# Patient Record
Sex: FEMALE | Race: WHITE | NOT HISPANIC OR LATINO | Employment: FULL TIME | URBAN - METROPOLITAN AREA
[De-identification: names, ages, dates, MRNs, and addresses within clinical notes are randomized per-mention and may not be internally consistent; named-entity substitution may affect disease eponyms.]

---

## 2023-11-24 ENCOUNTER — HOSPITAL ENCOUNTER (EMERGENCY)
Facility: HOSPITAL | Age: 48
Discharge: HOME | End: 2023-11-24
Attending: EMERGENCY MEDICINE
Payer: MEDICARE

## 2023-11-24 VITALS
TEMPERATURE: 97.2 F | RESPIRATION RATE: 18 BRPM | SYSTOLIC BLOOD PRESSURE: 101 MMHG | OXYGEN SATURATION: 100 % | HEART RATE: 81 BPM | DIASTOLIC BLOOD PRESSURE: 83 MMHG

## 2023-11-24 DIAGNOSIS — M25.571 PAIN IN RIGHT ANKLE: Primary | ICD-10-CM

## 2023-11-24 PROCEDURE — 99282 EMERGENCY DEPT VISIT SF MDM: CPT

## 2023-11-24 PROCEDURE — 99285 EMERGENCY DEPT VISIT HI MDM: CPT | Performed by: EMERGENCY MEDICINE

## 2023-11-24 PROCEDURE — 96372 THER/PROPH/DIAG INJ SC/IM: CPT

## 2023-11-24 RX ORDER — ACETAMINOPHEN 325 MG/1
975 TABLET ORAL ONCE
Status: COMPLETED | OUTPATIENT
Start: 2023-11-24 | End: 2023-11-24

## 2023-11-24 RX ORDER — NAPROXEN 500 MG/1
500 TABLET ORAL 2 TIMES DAILY WITH MEALS
Qty: 30 TABLET | Refills: 0 | Status: SHIPPED | OUTPATIENT
Start: 2023-11-24

## 2023-11-24 RX ORDER — KETOROLAC TROMETHAMINE 30 MG/ML
15 INJECTION, SOLUTION INTRAMUSCULAR; INTRAVENOUS ONCE
Status: COMPLETED | OUTPATIENT
Start: 2023-11-24 | End: 2023-11-24

## 2023-11-24 RX ADMIN — KETOROLAC TROMETHAMINE 15 MG: 30 INJECTION, SOLUTION INTRAMUSCULAR at 19:11

## 2023-11-24 RX ADMIN — ACETAMINOPHEN 975 MG: 325 TABLET, FILM COATED ORAL at 19:11

## 2023-11-24 NOTE — Clinical Note
Deb Flores was seen and treated in our emergency department on 11/24/2023. Diagnosis: R ankle pain    Manolo Em  may return to work on return date. She may return on this date: 11/28/2023         If you have any questions or concerns, please don't hesitate to call.       Amado Waggoner, DO    ______________________________           _______________          _______________  Hospital Representative                              Date                                Time

## 2023-11-24 NOTE — Clinical Note
Lisa Garcia was seen and treated in our emergency department on 11/24/2023. Diagnosis: R ankle pain    Haley King  may return to work on return date. She may return on this date: 11/28/2023         If you have any questions or concerns, please don't hesitate to call.       Kaur Carson, DO    ______________________________           _______________          _______________  Hospital Representative                              Date                                Time

## 2023-11-25 NOTE — ED PROVIDER NOTES
History  Chief Complaint   Patient presents with    Leg Pain     Right leg pain and swelling since last Friday. Patient denies SOB. HPI    Patient is a 44-year-old female with past medical history of depression, anxiety, type 2 diabetes, hyperlipidemia, hypothyroidism, presenting to the ED for evaluation of atraumatic right ankle pain past several days. Patient states that she noticed the pain approximately 1 week ago, is an initial aching in the right lateral ankle. She went to a water park the day after, and thought that she might have had shinsplints from that. The pain in the right ankle has been persistent, and is now traveling up the lateral side of the calf to about the knee. Patient is able to ambulate but with pain. She has tried taking Motrin for her symptoms, experiences intermittent relief. Patient denies injury to the ankle. She also feels that her ankle is more swollen when compared to the left 1. There is no redness, itching, sensitivity to the skin of the ankle. Patient denies any open wounds on the feet. She denies fevers, chills, long car rides or plane flights, smoking history. She does note prior DVT, related to pregnancy. Not currently on anticoagulation. None       Past Medical History:   Diagnosis Date    Diabetes mellitus ([address])     type 2    Hashimoto's disease        History reviewed. No pertinent surgical history. History reviewed. No pertinent family history. I have reviewed and agree with the history as documented. E-Cigarette/Vaping     E-Cigarette/Vaping Substances     Social History     Tobacco Use    Smoking status: Never    Smokeless tobacco: Never   Substance Use Topics    Alcohol use: Never    Drug use: Not Currently        Review of Systems   All other systems reviewed and are negative.       Physical Exam  ED Triage Vitals [11/24/23 1807]   Temperature Pulse Respirations Blood Pressure SpO2   (!) 97.2 °F (36.2 °C) 81 18 101/83 100 %      Temp Source Heart Neurosurgery/Event Note Rate Source Patient Position - Orthostatic VS BP Location FiO2 (%)   Temporal Monitor Sitting Left arm --      Pain Score       6             Orthostatic Vital Signs  Vitals:    11/24/23 1807   BP: 101/83   Pulse: 81   Patient Position - Orthostatic VS: Sitting       Physical Exam  Vitals and nursing note reviewed. Constitutional:       General: She is not in acute distress. Appearance: Normal appearance. She is well-developed and normal weight. She is not ill-appearing or toxic-appearing. HENT:      Head: Normocephalic and atraumatic. Right Ear: External ear normal.      Left Ear: External ear normal.      Nose: Nose normal. No congestion. Mouth/Throat:      Mouth: Mucous membranes are moist.      Pharynx: Oropharynx is clear. Eyes:      Extraocular Movements: Extraocular movements intact. Conjunctiva/sclera: Conjunctivae normal.   Cardiovascular:      Rate and Rhythm: Normal rate and regular rhythm. Pulses: Normal pulses. Heart sounds: Normal heart sounds. No murmur heard. Pulmonary:      Effort: Pulmonary effort is normal. No respiratory distress. Breath sounds: Normal breath sounds. No wheezing, rhonchi or rales. Abdominal:      General: Abdomen is flat. Palpations: Abdomen is soft. Tenderness: There is no abdominal tenderness. There is no guarding or rebound. Musculoskeletal:         General: No swelling. Cervical back: Normal range of motion and neck supple. No tenderness. Right knee: Normal.      Left knee: Normal.      Right lower leg: Normal. No swelling or tenderness. No edema. Left lower leg: Normal. No swelling or tenderness. No edema. Right ankle: No swelling. Tenderness present over the lateral malleolus. Normal pulse. Left ankle: No swelling. Right foot: Normal. Normal capillary refill. No swelling or deformity. Normal pulse. Left foot: Normal. Normal capillary refill. No swelling or deformity. Normal pulse. Comments: No calf tenderness, negative Homans' sign. The right lower extremity does not appear to have any evidence of cellulitis. Skin:     General: Skin is warm and dry. Capillary Refill: Capillary refill takes less than 2 seconds. Neurological:      Mental Status: She is alert. Psychiatric:         Mood and Affect: Mood normal.         ED Medications  Medications   ketorolac (TORADOL) injection 15 mg (15 mg Intramuscular Given 11/24/23 1911)   acetaminophen (TYLENOL) tablet 975 mg (975 mg Oral Given 11/24/23 1911)       Diagnostic Studies  Results Reviewed       None                   No orders to display         Procedures  Procedures      ED Course       Patient is a 44-year-old female presenting to the ED for evaluation of atraumatic right ankle pain. Patient states that the pain occasionally radiates to the proximal right knee. No history of injury. History and clinical exam documented above. The extremity does not appear cellulitic. There is negative Homans' sign. Patient reports taking Motrin, with slight improvement of the pain. This points to perhaps more likely musculoskeletal cause, then pathologic. There is no history of trauma, therefore x-ray was deferred. There is no tenderness of the deep vasculature of the leg, therefore DVT is not suspected. Will treat with Toradol and Tylenol for musculoskeletal pain. An Ace wrap was provided. Supportive care advised, reassurance provided. I gave oral return precautions for what to return for in addition to the written return precautions. The patient verbalized understanding of the discharge instructions and warnings that would necessitate return to the Emergency Department. I specifically highlighted areas of special concern regarding the written and verbal discharge instructions and return precautions. All questions were answered prior to discharge.       CRAFFT      Flowsheet Row Most Recent Value   MATILDA Initial Screen: During the past 12 months, did you:    1. Drink any alcohol (more than a few sips)? No Filed at: 11/24/2023 1839   2. Smoke any marijuana or hashish No Filed at: 11/24/2023 1839   3. Use anything else to get high? ("anything else" includes illegal drugs, over the counter and prescription drugs, and things that you sniff or 'kothari')? No Filed at: 11/24/2023 1839                            SBIRT 20yo+      Flowsheet Row Most Recent Value   Initial Alcohol Screen: US AUDIT-C     1. How often do you have a drink containing alcohol? 0 Filed at: 11/24/2023 1839   2. How many drinks containing alcohol do you have on a typical day you are drinking? 0 Filed at: 11/24/2023 1839   3a. Male UNDER 65: How often do you have five or more drinks on one occasion? 0 Filed at: 11/24/2023 1839   3b. FEMALE Any Age, or MALE 65+: How often do you have 4 or more drinks on one occassion? 0 Filed at: 11/24/2023 1839   Audit-C Score 0 Filed at: 11/24/2023 1839   ELLE: How many times in the past year have you. .. Used an illegal drug or used a prescription medication for non-medical reasons? Never Filed at: 11/24/2023 1839                  Medical Decision Making  Risk  OTC drugs. Prescription drug management. Disposition  Final diagnoses:   Pain in right ankle     Time reflects when diagnosis was documented in both MDM as applicable and the Disposition within this note       Time User Action Codes Description Comment    11/24/2023  7:09 PM Didi Storey Add [M25.571] Pain in right ankle           ED Disposition       ED Disposition   Discharge    Condition   Stable    Date/Time   Fri Nov 24, 2023 [address] discharge to home/self care.                    Follow-up Information       Follow up With Specialties Details Why [address] Andrew, DO Family Medicine Schedule an appointment as soon as possible for a visit   [address] [place] [address] [address]  [address]              Discharge Medication List as of 11/24/2023  7:49 PM        START taking these medications    Details   naproxen (Naprosyn) 500 mg tablet Take 1 tablet (500 mg total) by mouth 2 (two) times a day with meals, Starting Fri 11/24/2023, Normal           No discharge procedures on file. PDMP Review       None             ED Provider  Attending physically available and evaluated [facility]. I managed the patient along with the ED Attending.     Electronically Signed by           Donnell Go DO  11/24/23 [id number]

## 2023-11-25 NOTE — ED ATTENDING ATTESTATION
11/24/2023  I, Linnie Landau, MD, saw and evaluated the patient. I have discussed the patient with the resident/non-physician practitioner and agree with the resident's/non-physician practitioner's findings, Plan of Care, and MDM as documented in the resident's/non-physician practitioner's note, except where noted. All available labs and Radiology studies were reviewed. I was present for key portions of any procedure(s) performed by the resident/non-physician practitioner and I was immediately available to provide assistance. At this point I agree with the current assessment done in the Emergency Department. I have conducted an independent evaluation of this patient a history and physical is as follows:    R ankle pain. Was at [facility] on last Thursday and no trauma. Has lateral ankle and leg pain. No pitting edema. No trauma. Remote hx DVT in pregnancy. VS and nursing notes reviewed  General: Appears in NAD, awake, alert, speaking normally in full sentences. Well-nourished, well-developed. Appears stated age. Head: Normocephalic, atraumatic. Eyes: EOMI. Vision grossly normal. No subconjunctival hemorrhages or occular discharge noted. Symmetrical lids. ENT: Atraumatic external nose and ears. No stridor. Normal phonation. No drooling. Normal swallowing. Neck: No JVD. FROM. No goiter  CV: No pallor. Normal rate. Lungs: No tachypnea. No respiratory distress. MSK: Moving all extremities equally, no peripheral edema. Mild R ankle lateral tenderness. Skin: Dry, intact. No cyanosis  Neuro: Awake, alert, GCS15. CN II-XII grossly intact. Grossly normal gait. Psychiatric/Behavioral: Appropriate mood and affect. A/P: This is a 41 y.o. female who presents to the ED for evaluation of ankle pain. No evidence this is an acute DVT. This is more suspicious for MSK Pain. NSAIDS, supportive care. OP Followup.     ED Course         Critical Care Time  Procedures